# Patient Record
Sex: MALE | Race: WHITE | NOT HISPANIC OR LATINO | Employment: FULL TIME | URBAN - METROPOLITAN AREA
[De-identification: names, ages, dates, MRNs, and addresses within clinical notes are randomized per-mention and may not be internally consistent; named-entity substitution may affect disease eponyms.]

---

## 2017-03-07 ENCOUNTER — HOSPITAL ENCOUNTER (EMERGENCY)
Facility: HOSPITAL | Age: 52
Discharge: HOME | End: 2017-03-07
Attending: EMERGENCY MEDICINE
Payer: COMMERCIAL

## 2017-03-07 VITALS
DIASTOLIC BLOOD PRESSURE: 76 MMHG | OXYGEN SATURATION: 98 % | RESPIRATION RATE: 18 BRPM | HEIGHT: 64 IN | BODY MASS INDEX: 27.31 KG/M2 | HEART RATE: 67 BPM | SYSTOLIC BLOOD PRESSURE: 135 MMHG | TEMPERATURE: 98 F | WEIGHT: 160 LBS

## 2017-03-07 DIAGNOSIS — R10.9 FLANK PAIN: ICD-10-CM

## 2017-03-07 DIAGNOSIS — N20.0 KIDNEY STONE: Primary | ICD-10-CM

## 2017-03-07 LAB
ANION GAP SERPL CALC-SCNC: 8 MMOL/L
BASOPHILS # BLD AUTO: 0.02 K/UL
BASOPHILS NFR BLD: 0.2 %
BILIRUB UR QL STRIP: NEGATIVE
BUN SERPL-MCNC: 19 MG/DL
CALCIUM SERPL-MCNC: 9.3 MG/DL
CHLORIDE SERPL-SCNC: 108 MMOL/L
CLARITY UR: CLEAR
CO2 SERPL-SCNC: 26 MMOL/L
COLOR UR: YELLOW
CREAT SERPL-MCNC: 1.1 MG/DL
DIFFERENTIAL METHOD: ABNORMAL
EOSINOPHIL # BLD AUTO: 0.3 K/UL
EOSINOPHIL NFR BLD: 3.3 %
ERYTHROCYTE [DISTWIDTH] IN BLOOD BY AUTOMATED COUNT: 13 %
EST. GFR  (AFRICAN AMERICAN): >60 ML/MIN/1.73 M^2
EST. GFR  (NON AFRICAN AMERICAN): >60 ML/MIN/1.73 M^2
GLUCOSE SERPL-MCNC: 124 MG/DL
GLUCOSE UR QL STRIP: NEGATIVE
HCT VFR BLD AUTO: 41.6 %
HGB BLD-MCNC: 15 G/DL
HGB UR QL STRIP: ABNORMAL
KETONES UR QL STRIP: NEGATIVE
LEUKOCYTE ESTERASE UR QL STRIP: NEGATIVE
LYMPHOCYTES # BLD AUTO: 1.1 K/UL
LYMPHOCYTES NFR BLD: 13.5 %
MCH RBC QN AUTO: 31.5 PG
MCHC RBC AUTO-ENTMCNC: 36.1 %
MCV RBC AUTO: 87 FL
MICROSCOPIC COMMENT: ABNORMAL
MONOCYTES # BLD AUTO: 0.5 K/UL
MONOCYTES NFR BLD: 6 %
NEUTROPHILS # BLD AUTO: 6.4 K/UL
NEUTROPHILS NFR BLD: 77 %
NITRITE UR QL STRIP: NEGATIVE
PH UR STRIP: 5 [PH] (ref 5–8)
PLATELET # BLD AUTO: 152 K/UL
PMV BLD AUTO: 9.4 FL
POTASSIUM SERPL-SCNC: 4 MMOL/L
PROT UR QL STRIP: NEGATIVE
RBC # BLD AUTO: 4.76 M/UL
RBC #/AREA URNS HPF: 30 /HPF (ref 0–4)
SODIUM SERPL-SCNC: 142 MMOL/L
SP GR UR STRIP: >=1.03 (ref 1–1.03)
URN SPEC COLLECT METH UR: ABNORMAL
UROBILINOGEN UR STRIP-ACNC: NEGATIVE EU/DL
WBC # BLD AUTO: 8.37 K/UL
WBC #/AREA URNS HPF: 1 /HPF (ref 0–5)

## 2017-03-07 PROCEDURE — 63600175 PHARM REV CODE 636 W HCPCS: Performed by: EMERGENCY MEDICINE

## 2017-03-07 PROCEDURE — 80048 BASIC METABOLIC PNL TOTAL CA: CPT

## 2017-03-07 PROCEDURE — 99284 EMERGENCY DEPT VISIT MOD MDM: CPT | Mod: 25

## 2017-03-07 PROCEDURE — 85025 COMPLETE CBC W/AUTO DIFF WBC: CPT

## 2017-03-07 PROCEDURE — 96374 THER/PROPH/DIAG INJ IV PUSH: CPT

## 2017-03-07 PROCEDURE — 81000 URINALYSIS NONAUTO W/SCOPE: CPT

## 2017-03-07 RX ORDER — TAMSULOSIN HYDROCHLORIDE 0.4 MG/1
0.4 CAPSULE ORAL DAILY
Qty: 10 CAPSULE | Refills: 0 | Status: ON HOLD | OUTPATIENT
Start: 2017-03-07 | End: 2019-08-12 | Stop reason: HOSPADM

## 2017-03-07 RX ORDER — KETOROLAC TROMETHAMINE 30 MG/ML
30 INJECTION, SOLUTION INTRAMUSCULAR; INTRAVENOUS ONCE
Status: COMPLETED | OUTPATIENT
Start: 2017-03-07 | End: 2017-03-07

## 2017-03-07 RX ORDER — AMOXICILLIN 500 MG
2 CAPSULE ORAL
COMMUNITY

## 2017-03-07 RX ORDER — KETOROLAC TROMETHAMINE 10 MG/1
10 TABLET, FILM COATED ORAL EVERY 6 HOURS
Qty: 8 TABLET | Refills: 0 | Status: ON HOLD | OUTPATIENT
Start: 2017-03-07 | End: 2019-08-12

## 2017-03-07 RX ORDER — ONDANSETRON 4 MG/1
4 TABLET, FILM COATED ORAL EVERY 6 HOURS
Qty: 12 TABLET | Refills: 0 | Status: ON HOLD | OUTPATIENT
Start: 2017-03-07 | End: 2019-08-12

## 2017-03-07 RX ORDER — SODIUM, POTASSIUM,MAG SULFATES 17.5-3.13G
1 SOLUTION, RECONSTITUTED, ORAL ORAL
Status: ON HOLD | COMMUNITY
Start: 2016-06-27 | End: 2019-08-12

## 2017-03-07 RX ORDER — ASPIRIN 81 MG/1
81 TABLET ORAL
COMMUNITY

## 2017-03-07 RX ADMIN — KETOROLAC TROMETHAMINE 30 MG: 30 INJECTION, SOLUTION INTRAMUSCULAR at 08:03

## 2017-03-07 NOTE — ED AVS SNAPSHOT
[facility] -   [address]  [facility] [address]               Sanket Tenorio   3/7/2017  7:47 AM   ED    Description:  Male : 1965   Department:  [facility]           Your Care was Coordinated By:     Provider Role From To    Oswald Khan MD Attending Provider 17 0754 --      Reason for Visit     Flank Pain           Diagnoses this Visit        Comments    Kidney stone    -  Primary     Flank pain           ED Disposition     None           To Do List           Follow-up Information     Follow up with Benji Thorne MD In 1 day.    Specialty:  Internal Medicine    Why:  Patient should return to the ED for any concerns or worsening of condition.    Contact information:    [address] [address] [phone]          Follow up with Benji Lo IV, MD.    Specialty:  Urology    Why:  As needed    Contact information:    [address]  [place] [address] [phone]         These Medications        Disp Refills Start End    tamsulosin (FLOMAX) 0.4 mg Cp24 10 capsule 0 3/7/2017 3/7/2018    Take 1 capsule (0.4 mg total) by mouth once daily. - Oral    ondansetron (ZOFRAN) 4 MG tablet 12 tablet 0 3/7/2017     Take 1 tablet (4 mg total) by mouth every 6 (six) hours. - Oral    ketorolac (TORADOL) 10 mg tablet 8 tablet 0 3/7/2017     Take 1 tablet (10 mg total) by mouth every 6 (six) hours. - Oral      [facility] On [facility]     [facility] -  Assistance  Registered nurses in the [facility] provide clinical advisement, health education, appointment booking, and other advisory services.  Call for this free service at [phone].             Medications           Message regarding Medications     Verify the changes and/or additions to your medication regime listed below are the same as discussed with your clinician today.  If any of these changes or additions are incorrect,  "please notify your healthcare provider.        START taking these NEW medications        Refills    tamsulosin (FLOMAX) 0.4 mg Cp24 0    Sig: Take 1 capsule (0.4 mg total) by mouth once daily.    Class: Print    Route: Oral    ondansetron (ZOFRAN) 4 MG tablet 0    Sig: Take 1 tablet (4 mg total) by mouth every 6 (six) hours.    Class: Print    Route: Oral    ketorolac (TORADOL) 10 mg tablet 0    Sig: Take 1 tablet (10 mg total) by mouth every 6 (six) hours.    Class: Print    Route: Oral      These medications were administered today        Dose Freq    ketorolac injection 30 mg 30 mg Once    Sig: Inject 30 mg into the vein once.    Class: Normal    Route: Intravenous    Non-formulary Exception Code: Defer to pharmacy           Verify that the below list of medications is an accurate representation of the medications you are currently taking.  If none reported, the list may be blank. If incorrect, please contact your healthcare provider. Carry this list with you in case of emergency.           Current Medications     aspirin (ECOTRIN) 81 MG EC tablet Take 81 mg by mouth.    B COMPLEX C NO.10/FOLIC ACID (FOLIC ACID-B COMPLEX & C NO.10 ORAL) Take by mouth.    fish oil-omega-3 fatty acids 300-1,000 mg capsule Take 2 g by mouth.    ranitidine (ZANTAC) 150 MG tablet     sodium,potassium,mag sulfates 17.5-3.13-1.6 gram SolR Take 1 Bottle by mouth.    ketorolac (TORADOL) 10 mg tablet Take 1 tablet (10 mg total) by mouth every 6 (six) hours.    ondansetron (ZOFRAN) 4 MG tablet Take 1 tablet (4 mg total) by mouth every 6 (six) hours.    tamsulosin (FLOMAX) 0.4 mg Cp24 Take 1 capsule (0.4 mg total) by mouth once daily.           Clinical Reference Information           Your Vitals Were     BP Pulse Temp Resp Height Weight    135/76 (BP Location: Right arm, Patient Position: Lying, BP Method: Automatic) 67 97.8 °F (36.6 °C) (Oral) 18 5' 4" (1.626 m) 72.6 kg (160 lb)    SpO2 BMI             98% 27.46 kg/m2         Allergies as " of 3/7/2017        Reactions    Cephalexin     Clindamycin Hcl     Penicillins     Promethazine       Immunizations Administered on Date of Encounter - 3/7/2017     None      ED Micro, Lab, POCT     Start Ordered       Status Ordering Provider    03/07/17 0807 03/07/17 0807  CBC auto differential  STAT      Final result     03/07/17 0807 03/07/17 0807  Basic metabolic panel  STAT      Final result     03/07/17 0807 03/07/17 0807  Urinalysis  STAT      Final result     03/07/17 0807 03/07/17 0807  Urinalysis Microscopic  Once      Final result       ED Imaging Orders     Start Ordered       Status Ordering Provider    03/07/17 0807 03/07/17 0807  CT Renal Stone Study ABD Pelvis WO  1 time imaging      Final result       [facility] Sign-Up     Activating your [facility] account is as easy as 1-2-3!     1) Visit [email or web address], select Sign Up Now, enter this activation code and your date of birth, then select Next.  [email or web address]  Expires: 4/21/2017 11:43 AM      2) Create a username and password to use when you visit [facility] in the future and select a security question in case you lose your password and select Next.    3) Enter your e-mail address and click Sign Up!    Additional Information  If you have questions, please e-mail [email or web address] or call [phone] to talk to our [facility] staff. Remember, [facility] is NOT to be used for urgent needs. For medical emergencies, dial 911.          [facility] complies with applicable Federal civil rights laws and does not discriminate on the basis of race, color, national origin, age, disability, or sex.        Language Assistance Services     ATTENTION: Language assistance services are available, free of charge. Please call [phone].      ATENCIÓN: Si habla español, tiene a cannon disposición servicios gratuitos de asistencia lingüística. Llame al [phone].     CHÚ Ý: N?u b?n nói Ti?ng Vi?t, có các d?ch v? h? tr? ngôn ng? mi?n phí dành  bhumika palacio?[place]?i s? [phone].

## 2017-03-07 NOTE — ED PROVIDER NOTES
SCRIBE #1 NOTE: I, Ravi Bey, am scribing for, and in the presence of, Oswald Khan MD. I have scribed the entire note.      History      Chief Complaint   Patient presents with    Flank Pain     Patient c/o left flank pain, urinary urgency and nausea        Review of patient's allergies indicates:   Allergen Reactions    Cephalexin     Clindamycin hcl     Penicillins     Promethazine         HPI   HPI    3/7/2017, 8:41 AM   History obtained from the patient      History of Present Illness: Sanket Tenorio is a 51 y.o. male patient who presents to the Emergency Department for LLQ abd pain which onset gradually this AM. Pt describes his sxs as a sharp pain. Symptoms are constant and moderate in severity. Sx are exacerbated by nothing and relieved by nothing. Associated sxs include nausea and urinary frequency/urgency which onset last week. Pt states he thinks he has a kidney stone secondary to having these sxs in the past when he was dx with a kidney stone. Pt reports last having a kidney stone 7 years ago. Patient denies any fever, chills, flank pain, vomiting, diarrhea, constipation, difficulty urinating, dysuria, hematuria and all other sxs at this time. No further complaints or concerns at this time.     Arrival mode: Personal vehicle     PCP: Benji Thorne MD       Past Medical History:  History reviewed. No pertinent past medical history.    Past Surgical History:  Past Surgical History:   Procedure Laterality Date    EYE SURGERY           Family History:  History reviewed. No pertinent family history.    Social History:  Social History     Social History Main Topics    Smoking status: Never Smoker    Smokeless tobacco: Unknown    Alcohol use No    Drug use: Unknown    Sexual activity: Unknown       ROS   Review of Systems   Constitutional: Negative for chills and fever.   HENT: Negative for congestion and sore throat.    Respiratory: Negative for chest tightness and shortness of  breath.    Cardiovascular: Negative for chest pain.   Gastrointestinal: Positive for abdominal pain (LLQ) and nausea. Negative for vomiting.   Genitourinary: Positive for frequency and urgency. Negative for decreased urine volume, difficulty urinating, dysuria, flank pain and hematuria.   Musculoskeletal: Negative for back pain and neck pain.   Skin: Negative for rash.   Neurological: Negative for dizziness, numbness and headaches.   Psychiatric/Behavioral: Negative for agitation and confusion.   All other systems reviewed and are negative.      Physical Exam    Initial Vitals   BP Pulse Resp Temp SpO2   03/07/17 0741 03/07/17 0741 03/07/17 0741 03/07/17 0741 03/07/17 0741   179/92 62 18 97.8 °F (36.6 °C) 100 %      Physical Exam  Nursing Notes and Vital Signs Reviewed.  Constitutional: Patient is in no apparent distress. Awake and alert. Well-developed and well-nourished.  Head: Atraumatic. Normocephalic.  Eyes: PERRL. EOM intact. Conjunctivae are not pale. No scleral icterus.  ENT: Mucous membranes are moist. Oropharynx is clear and symmetric.    Neck: Supple. Full ROM. No lymphadenopathy.  Cardiovascular: Regular rate. Regular rhythm. No murmurs, rubs, or gallops. Distal pulses are 2+ and symmetric.  Pulmonary/Chest: No respiratory distress. Clear to auscultation bilaterally. No wheezing, rales, or rhonchi.  Abdominal: Soft and non-distended.  There is no tenderness.  No rebound, guarding, or rigidity. Good bowel sounds.  Genitourinary: No CVA tenderness.  Musculoskeletal: Moves all extremities. No obvious deformities. No edema. No calf tenderness. Radial pulses are equal and 2+ bilaterally. DP and PT pulses are equal and 2+ bilaterally.   Skin: Warm and dry.  Neurological: Patient is alert and oriented to person, place and time. Pupils ERRL and EOM normal. Cranial nerves II-XII are intact. Strength is full bilaterally; it is equal and 5/5 in bilateral upper and lower extremities. Light touch sense is intact.  "Speech is clear and normal. No acute focal neurological deficits noted.  Psychiatric: Normal affect. Good eye contact. Appropriate in content.    ED Course    Procedures  ED Vital Signs:  Vitals:    03/07/17 0741 03/07/17 1102   BP: (!) 179/92 135/76   Pulse: 62 67   Resp: 18 18   Temp: 97.8 °F (36.6 °C)    TempSrc: Oral    SpO2: 100% 98%   Weight: 72.6 kg (160 lb)    Height: 5' 4" (1.626 m)        Abnormal Lab Results:  Labs Reviewed   CBC W/ AUTO DIFFERENTIAL - Abnormal; Notable for the following:        Result Value    MCH 31.5 (*)     MCHC 36.1 (*)     Gran% 77.0 (*)     Lymph% 13.5 (*)     All other components within normal limits   BASIC METABOLIC PANEL - Abnormal; Notable for the following:     Glucose 124 (*)     All other components within normal limits   URINALYSIS - Abnormal; Notable for the following:     Specific Gravity, UA >=1.030 (*)     Occult Blood UA 3+ (*)     All other components within normal limits   URINALYSIS MICROSCOPIC - Abnormal; Notable for the following:     RBC, UA 30 (*)     All other components within normal limits        All Lab Results:  Results for orders placed or performed during the hospital encounter of 03/07/17   CBC auto differential   Result Value Ref Range    WBC 8.37 3.90 - 12.70 K/uL    RBC 4.76 4.60 - 6.20 M/uL    Hemoglobin 15.0 14.0 - 18.0 g/dL    Hematocrit 41.6 40.0 - 54.0 %    MCV 87 82 - 98 fL    MCH 31.5 (H) 27.0 - 31.0 pg    MCHC 36.1 (H) 32.0 - 36.0 %    RDW 13.0 11.5 - 14.5 %    Platelets 152 150 - 350 K/uL    MPV 9.4 9.2 - 12.9 fL    Gran # 6.4 1.8 - 7.7 K/uL    Lymph # 1.1 1.0 - 4.8 K/uL    Mono # 0.5 0.3 - 1.0 K/uL    Eos # 0.3 0.0 - 0.5 K/uL    Baso # 0.02 0.00 - 0.20 K/uL    Gran% 77.0 (H) 38.0 - 73.0 %    Lymph% 13.5 (L) 18.0 - 48.0 %    Mono% 6.0 4.0 - 15.0 %    Eosinophil% 3.3 0.0 - 8.0 %    Basophil% 0.2 0.0 - 1.9 %    Differential Method Automated    Basic metabolic panel   Result Value Ref Range    Sodium 142 136 - 145 mmol/L    Potassium 4.0 3.5 - " 5.1 mmol/L    Chloride 108 95 - 110 mmol/L    CO2 26 23 - 29 mmol/L    Glucose 124 (H) 70 - 110 mg/dL    BUN, Bld 19 6 - 20 mg/dL    Creatinine 1.1 0.5 - 1.4 mg/dL    Calcium 9.3 8.7 - 10.5 mg/dL    Anion Gap 8 8 - 16 mmol/L    eGFR if African American >60 >60 mL/min/1.73 m^2    eGFR if non African American >60 >60 mL/min/1.73 m^2   Urinalysis   Result Value Ref Range    Specimen UA Urine, Clean Catch     Color, UA Yellow Yellow, Straw, Fauzia    Appearance, UA Clear Clear    pH, UA 5.0 5.0 - 8.0    Specific Gravity, UA >=1.030 (A) 1.005 - 1.030    Protein, UA Negative Negative    Glucose, UA Negative Negative    Ketones, UA Negative Negative    Bilirubin (UA) Negative Negative    Occult Blood UA 3+ (A) Negative    Nitrite, UA Negative Negative    Urobilinogen, UA Negative <2.0 EU/dL    Leukocytes, UA Negative Negative   Urinalysis Microscopic   Result Value Ref Range    RBC, UA 30 (H) 0 - 4 /hpf    WBC, UA 1 0 - 5 /hpf    Microscopic Comment SEE COMMENT          Imaging Results:  Imaging Results         CT Renal Stone Study ABD Pelvis WO (Final result) Result time:  03/07/17 09:36:34    Final result by Rajinder Hoover MD (03/07/17 09:36:34)    Impression:        0.3 cm obstructing stone distal left ureter with mild left hydronephrosis.    Punctate nonobstructing stone right kidney.    Mild splenomegaly.      All CT scans at this facility use dose modulation, iterative reconstruction and/or weight based dosing when appropriate to reduced radiation dose to as low as reasonably achievable.        Electronically signed by: RAJINDER HOOVER MD  Date:     03/07/17  Time:    09:36     Narrative:    EXAM:CT RENAL STONE STUDY ABD PELVIS WO 03/07/17 08:50:06      HISTORY: Left lower quadrant abdominal pain.    TECHNIQUE: Standard axial imaging performed with reformatted sagittal and coronal images.       COMPARISON: No previous examination available for comparison.      FINDINGS:    Lung bases are clear.    The  liver, gallbladder are normal.  The spleen is mildly enlarged measuring up to 14.3 cm in maximal cross-sectional dimension.  The pancreas is normal.  Adrenal glands are normal.  The aorta and IVC are normal.    The left kidney demonstrates mild left hydronephrosis.  There is a 0.3 cm obstructing calculus within the distal left ureter located approximately 2 cm proximal to the uterovesical junction.  Mild left hydronephrosis and ureteral dilatation is present.    0.3 cm nonobstructing calculus identified upper pole right kidney.  Right ureter is normal.  The bladder is normal.    The stomach is normal in appearance.  The small intestine is normal.  The pancreas is normal.  Colon is normal.  The rectum is normal.    The bladder is only minimally distended.  The prostate is normal.    Degenerative changes of the spine noted.  No suspicious osseous lesions.                      The Emergency Provider reviewed the vital signs and test results, which are outlined above.    ED Discussion     11:05 AM: Reassessed pt at this time.  Pt states his condition has improved at this time. Pt is laying comfortably in ED bed and in NAD. Pt is awake, alert, and oriented. Discussed with pt all pertinent ED information and results. Instructed pt to f/u with Dr. oL (Urology). Discussed pt dx and plan of tx. Gave pt all f/u and return to the ED instructions. All questions and concerns were addressed at this time. Pt expresses understanding of information and instructions, and is comfortable with plan to discharge. Pt is stable for discharge.    I discussed with patient and/or family/caretaker that evaluation in the ED does not suggest any emergent or life threatening medical conditions requiring immediate intervention beyond what was provided in the ED, and I believe patient is safe for discharge.  Regardless, an unremarkable evaluation in the ED does not preclude the development or presence of a serious of life threatening  condition. As such, patient was instructed to return immediately for any worsening or change in current symptoms.      ED Medication(s):  Medications   ketorolac injection 30 mg (30 mg Intravenous Given 3/7/17 0830)       New Prescriptions    No medications on file             Medical Decision Making    Medical Decision Making:   Clinical Tests:   Lab Tests: Reviewed and Ordered  Radiological Study: Reviewed and Ordered           Scribe Attestation:   Scribe #1: I performed the above scribed service and the documentation accurately describes the services I performed. I attest to the accuracy of the note.    Attending:   Physician Attestation Statement for Scribe #1: I, Oswald Khan MD, personally performed the services described in this documentation, as scribed by Ravi Bey, in my presence, and it is both accurate and complete.          Clinical Impression       ICD-10-CM ICD-9-CM   1. Flank pain R10.9 789.09       Disposition:   Disposition: Discharged  Condition: Stable         Oswald Khan MD  03/07/17 1510

## 2017-03-07 NOTE — ED NOTES
Patient identifiers verified and correct for Sanket BRADY Tenorio.    Patient c/o left flank pain, urinary urgency and nausea for the last few days.    LOC: The patient is awake, alert and aware of environment with an appropriate affect, the patient is oriented x 3 and speaking appropriately.  APPEARANCE: Patient resting comfortably and in no acute distress, patient is clean and well groomed, patient's clothing is properly fastened.  SKIN: The skin is warm and dry, color consistent with ethnicity, patient has normal skin turgor and moist mucus membranes, skin intact, no breakdown or bruising noted.  MUSCULOSKELETAL: Patient moving all extremities spontaneously.  RESPIRATORY: Airway is open and patent, respirations are spontaneous.  CARDIAC: Patient has a normal rate, no periphreal edema noted, capillary refill < 3 seconds, elevated BP  ABDOMEN: Soft and non tender to palpation.

## 2019-08-12 ENCOUNTER — HOSPITAL ENCOUNTER (OUTPATIENT)
Facility: HOSPITAL | Age: 54
Discharge: HOME | End: 2019-08-12
Attending: EMERGENCY MEDICINE | Admitting: INTERNAL MEDICINE
Payer: COMMERCIAL

## 2019-08-12 VITALS
RESPIRATION RATE: 20 BRPM | DIASTOLIC BLOOD PRESSURE: 68 MMHG | OXYGEN SATURATION: 94 % | SYSTOLIC BLOOD PRESSURE: 108 MMHG | WEIGHT: 160.5 LBS | TEMPERATURE: 96 F | HEART RATE: 51 BPM | BODY MASS INDEX: 27.4 KG/M2 | HEIGHT: 64 IN

## 2019-08-12 DIAGNOSIS — Z82.49 FAMILY HISTORY OF EARLY CAD: ICD-10-CM

## 2019-08-12 DIAGNOSIS — R10.13 EPIGASTRIC PAIN: ICD-10-CM

## 2019-08-12 DIAGNOSIS — R07.9 CHEST PAIN: Primary | ICD-10-CM

## 2019-08-12 PROBLEM — R73.09 ELEVATED GLUCOSE: Status: ACTIVE | Noted: 2019-08-12

## 2019-08-12 PROBLEM — K21.9 GERD (GASTROESOPHAGEAL REFLUX DISEASE): Status: ACTIVE | Noted: 2019-08-12

## 2019-08-12 LAB
ALBUMIN SERPL BCP-MCNC: 3.9 G/DL (ref 3.5–5.2)
ALP SERPL-CCNC: 109 U/L (ref 55–135)
ALT SERPL W/O P-5'-P-CCNC: 49 U/L (ref 10–44)
ANION GAP SERPL CALC-SCNC: 7 MMOL/L (ref 8–16)
AST SERPL-CCNC: 23 U/L (ref 10–40)
BASOPHILS # BLD AUTO: 0.01 K/UL (ref 0–0.2)
BASOPHILS NFR BLD: 0.2 % (ref 0–1.9)
BILIRUB SERPL-MCNC: 0.3 MG/DL (ref 0.1–1)
BUN SERPL-MCNC: 19 MG/DL (ref 6–20)
CALCIUM SERPL-MCNC: 9.3 MG/DL (ref 8.7–10.5)
CHLORIDE SERPL-SCNC: 105 MMOL/L (ref 95–110)
CO2 SERPL-SCNC: 26 MMOL/L (ref 23–29)
CREAT SERPL-MCNC: 0.9 MG/DL (ref 0.5–1.4)
DIFFERENTIAL METHOD: ABNORMAL
EOSINOPHIL # BLD AUTO: 0.1 K/UL (ref 0–0.5)
EOSINOPHIL NFR BLD: 1.9 % (ref 0–8)
ERYTHROCYTE [DISTWIDTH] IN BLOOD BY AUTOMATED COUNT: 12.8 % (ref 11.5–14.5)
EST. GFR  (AFRICAN AMERICAN): >60 ML/MIN/1.73 M^2
EST. GFR  (NON AFRICAN AMERICAN): >60 ML/MIN/1.73 M^2
ESTIMATED AVG GLUCOSE: 105 MG/DL (ref 68–131)
GLUCOSE SERPL-MCNC: 140 MG/DL (ref 70–110)
HBA1C MFR BLD HPLC: 5.3 % (ref 4–5.6)
HCT VFR BLD AUTO: 42.8 % (ref 40–54)
HCV AB SERPL QL IA: NEGATIVE
HGB BLD-MCNC: 15.5 G/DL (ref 14–18)
HIV 1+2 AB+HIV1 P24 AG SERPL QL IA: NEGATIVE
LIPASE SERPL-CCNC: 8 U/L (ref 4–60)
LYMPHOCYTES # BLD AUTO: 1.2 K/UL (ref 1–4.8)
LYMPHOCYTES NFR BLD: 18.4 % (ref 18–48)
MCH RBC QN AUTO: 32.1 PG (ref 27–31)
MCHC RBC AUTO-ENTMCNC: 36.2 G/DL (ref 32–36)
MCV RBC AUTO: 89 FL (ref 82–98)
MONOCYTES # BLD AUTO: 0.3 K/UL (ref 0.3–1)
MONOCYTES NFR BLD: 5.2 % (ref 4–15)
NEUTROPHILS # BLD AUTO: 4.8 K/UL (ref 1.8–7.7)
NEUTROPHILS NFR BLD: 74.5 % (ref 38–73)
PLATELET # BLD AUTO: 167 K/UL (ref 150–350)
PMV BLD AUTO: 9.3 FL (ref 9.2–12.9)
POTASSIUM SERPL-SCNC: 4.5 MMOL/L (ref 3.5–5.1)
PROT SERPL-MCNC: 7.1 G/DL (ref 6–8.4)
RBC # BLD AUTO: 4.83 M/UL (ref 4.6–6.2)
SODIUM SERPL-SCNC: 138 MMOL/L (ref 136–145)
TROPONIN I SERPL DL<=0.01 NG/ML-MCNC: 0.01 NG/ML (ref 0–0.03)
TROPONIN I SERPL DL<=0.01 NG/ML-MCNC: 0.01 NG/ML (ref 0–0.03)
TROPONIN I SERPL DL<=0.01 NG/ML-MCNC: 0.02 NG/ML (ref 0–0.03)
WBC # BLD AUTO: 6.4 K/UL (ref 3.9–12.7)

## 2019-08-12 PROCEDURE — 25000003 PHARM REV CODE 250: Performed by: PHYSICIAN ASSISTANT

## 2019-08-12 PROCEDURE — G0378 HOSPITAL OBSERVATION PER HR: HCPCS

## 2019-08-12 PROCEDURE — 93005 ELECTROCARDIOGRAM TRACING: CPT

## 2019-08-12 PROCEDURE — 84484 ASSAY OF TROPONIN QUANT: CPT

## 2019-08-12 PROCEDURE — 63600175 PHARM REV CODE 636 W HCPCS: Performed by: EMERGENCY MEDICINE

## 2019-08-12 PROCEDURE — 96374 THER/PROPH/DIAG INJ IV PUSH: CPT

## 2019-08-12 PROCEDURE — 86803 HEPATITIS C AB TEST: CPT

## 2019-08-12 PROCEDURE — 93010 EKG 12-LEAD: ICD-10-PCS | Mod: ,,, | Performed by: INTERNAL MEDICINE

## 2019-08-12 PROCEDURE — 86703 HIV-1/HIV-2 1 RESULT ANTBDY: CPT

## 2019-08-12 PROCEDURE — 80053 COMPREHEN METABOLIC PANEL: CPT

## 2019-08-12 PROCEDURE — 84484 ASSAY OF TROPONIN QUANT: CPT | Mod: 91

## 2019-08-12 PROCEDURE — 93010 ELECTROCARDIOGRAM REPORT: CPT | Mod: ,,, | Performed by: INTERNAL MEDICINE

## 2019-08-12 PROCEDURE — 99285 EMERGENCY DEPT VISIT HI MDM: CPT | Mod: 25

## 2019-08-12 PROCEDURE — 83690 ASSAY OF LIPASE: CPT

## 2019-08-12 PROCEDURE — 85025 COMPLETE CBC W/AUTO DIFF WBC: CPT

## 2019-08-12 PROCEDURE — 83036 HEMOGLOBIN GLYCOSYLATED A1C: CPT

## 2019-08-12 PROCEDURE — 36415 COLL VENOUS BLD VENIPUNCTURE: CPT

## 2019-08-12 PROCEDURE — 25000003 PHARM REV CODE 250: Performed by: EMERGENCY MEDICINE

## 2019-08-12 RX ORDER — ONDANSETRON 8 MG/1
8 TABLET, ORALLY DISINTEGRATING ORAL EVERY 8 HOURS PRN
Status: DISCONTINUED | OUTPATIENT
Start: 2019-08-12 | End: 2019-08-12 | Stop reason: HOSPADM

## 2019-08-12 RX ORDER — HYDROCODONE BITARTRATE AND ACETAMINOPHEN 5; 325 MG/1; MG/1
1 TABLET ORAL EVERY 6 HOURS PRN
Status: DISCONTINUED | OUTPATIENT
Start: 2019-08-12 | End: 2019-08-12 | Stop reason: HOSPADM

## 2019-08-12 RX ORDER — ASPIRIN 325 MG
325 TABLET ORAL
Status: COMPLETED | OUTPATIENT
Start: 2019-08-12 | End: 2019-08-12

## 2019-08-12 RX ORDER — ASPIRIN 81 MG/1
81 TABLET ORAL DAILY
Status: DISCONTINUED | OUTPATIENT
Start: 2019-08-12 | End: 2019-08-12 | Stop reason: HOSPADM

## 2019-08-12 RX ORDER — ENOXAPARIN SODIUM 100 MG/ML
40 INJECTION SUBCUTANEOUS EVERY 24 HOURS
Status: DISCONTINUED | OUTPATIENT
Start: 2019-08-12 | End: 2019-08-12 | Stop reason: HOSPADM

## 2019-08-12 RX ORDER — ONDANSETRON 2 MG/ML
4 INJECTION INTRAMUSCULAR; INTRAVENOUS
Status: COMPLETED | OUTPATIENT
Start: 2019-08-12 | End: 2019-08-12

## 2019-08-12 RX ORDER — FAMOTIDINE 20 MG/1
20 TABLET, FILM COATED ORAL 2 TIMES DAILY
Status: DISCONTINUED | OUTPATIENT
Start: 2019-08-12 | End: 2019-08-12 | Stop reason: HOSPADM

## 2019-08-12 RX ORDER — ACETAMINOPHEN 325 MG/1
650 TABLET ORAL EVERY 6 HOURS PRN
Status: DISCONTINUED | OUTPATIENT
Start: 2019-08-12 | End: 2019-08-12 | Stop reason: HOSPADM

## 2019-08-12 RX ORDER — NITROGLYCERIN 0.4 MG/1
0.4 TABLET SUBLINGUAL
Status: COMPLETED | OUTPATIENT
Start: 2019-08-12 | End: 2019-08-12

## 2019-08-12 RX ADMIN — NITROGLYCERIN 0.4 MG: 0.4 TABLET, ORALLY DISINTEGRATING SUBLINGUAL at 05:08

## 2019-08-12 RX ADMIN — ONDANSETRON 4 MG: 2 INJECTION INTRAMUSCULAR; INTRAVENOUS at 05:08

## 2019-08-12 RX ADMIN — ASPIRIN 81 MG: 81 TABLET, COATED ORAL at 08:08

## 2019-08-12 RX ADMIN — ASPIRIN 325 MG ORAL TABLET 325 MG: 325 PILL ORAL at 05:08

## 2019-08-12 RX ADMIN — FAMOTIDINE 20 MG: 20 TABLET, FILM COATED ORAL at 08:08

## 2019-08-12 NOTE — ED NOTES
Armband checked, patient verified. Verified by spelling and stated name on armband along with .   LOC: The patient is awake, alert and aware of environment with an appropriate affect, the patient is oriented x 3 and speaking appropriately.  APPEARANCE: Patient resting comfortably and in no acute distress, patient is clean and well groomed  SKIN: The skin is warm and dry, color consistent with ethnicity, patient has normal skin turgor and moist mucus membranes, no breakdown or bruising noted.   MUSCULOSKELETAL: Patient moving all extremities to command  RESPIRATORY: Airway is open and patent, respirations are regular, even and non labored.  CARDIAC: Patient has a normal rate, no periphreal edema noted, capillary refill < 3 seconds. Pt states does have a hx of bradycardia when questioned about low heart rate.   ABDOMEN: Soft and non tender to palpation. Pt states started with epigastric pain at 1am with some nausea. Denies SOB or vomiting.  States has reflux and thought pain was gas but didn't go away.  Denies chest pain hx.   Call light in reach. HOB elevated to comfort.

## 2019-08-12 NOTE — NURSING
Pt arrived to unit from ED in no acute distress. VSS. Tele monitor #[id number] applied to pt and verified with monitor tech. Phone report given from ED nurse to primary nurse SHILA Chua. PIV saline locked to LUE. Bed low, side rails up x2, call light in reach, non-slip footwear socks in place. Family present at bedside. Pt has no complaints at this time, instructed to call for assistance.

## 2019-08-12 NOTE — ED NOTES
Pt states nausea is relieved at this time. States pain got better after Nitro given.  Pain 2/10.

## 2019-08-12 NOTE — HPI
Sanket Tenorio is a 53 year old female with GERD who presents to the ED with complaints of epigastric abdominal pain that work him from sleep around 1 AM today. The pain is described as pressure like and rated as high as 8/10 on a pain scale. He initially thought the pain was gas, but it was unrelieved by two bowel movements. There was initially associated nausea and, later, after the pain persisted, he experienced diaphoresis while driving himself to the ED. He denies radiation of the pain, shortness of breath and prior history of similar pain. He denies a family history or early heart disease. In the ED, work up was essentially negative.

## 2019-08-12 NOTE — ED NOTES
Pt found lying quietly in bed awake, alert and oriented x 3, denies any chest pain at present. Skin warm and dry, respirations even non-labored, BBS-CTA, abd soft non-tender, normal BS, pulses, motion and sensation intact all ext. IV in place 20 ga to left forearm, CM in place showing SB at rate of 57. Side rails up x 2, bed in low position, wife at bedside, updated pt on plan of care.

## 2019-08-12 NOTE — PROGRESS NOTES
Pt and spouse reviewed AVS they verbalized understanding and deny having further questions. The wife states she will arrange the pts follow up apts as they are not with [facility] and denies establishment with [facility] at this time since he is already followed by a pcp  Tele monitor removed and returned to MT room. PIV removed, tip in tact. Pt ambulated off unit to waiting car at this time with out incident

## 2019-08-12 NOTE — ASSESSMENT & PLAN NOTE
-Atypical in nature.   -Due to low risk, HEART score of 1, plan to repeat troponin with outpatient follow up if it remains negative.   -Continue ASA and telemetry monitoring.

## 2019-08-12 NOTE — PROGRESS NOTES
Clinical Pharmacy Progress Note: Telemetry Pharmacist Rounding     Pharmacist counseled patient on the telemetry pharmacist's availability to discuss new medications, side effects, and reasons for changes in medication during admission.   Asked if patient had any medication questions at this time.   Instructed patient to use call light with any questions or concerns to discuss with pharmacy during the admission.  Offered bedside medication delivery to patient.   Patient expressed understanding and had no further questions.    Thank you for allowing us to participate in this patient's care.  Jayne Leyva PharmD 08/12/2019 3:18 PM

## 2019-08-12 NOTE — DISCHARGE INSTRUCTIONS
Discussed trial of over the count PPI such as Prilosec 40 mg daily X 14 days. Can continue ranitidine as needed while on the PPI. But, Cardiology follow up as soon as possible for possible stress test is necessary. Return to the ED if symptoms return. If symptoms persist with a negative Cardiac work up, recommend follow up with a Gastroenterologist.

## 2019-08-12 NOTE — H&P
[facility]  History & Physical    Patient Name: Sanket Tenorio  MRN: [id number]  Admission Date: 8/12/2019  Attending Physician: Rohan Chamberlain, *   Primary Care Provider: Benji Thorne MD         Patient information was obtained from patient, past medical records and ER records.     Subjective:     Principal Problem:Chest pain    Chief Complaint:   Chief Complaint   Patient presents with    Chest Pain     epigastric chest pain described as feeling like gas, woke pt from sleep around 1am        HPI: Sanket Tenorio is a 53 year old female with GERD who presents to the ED with complaints of epigastric abdominal pain that work him from sleep around 1 AM today. The pain is described as pressure like and rated as high as 8/10 on a pain scale. He initially thought the pain was gas, but it was unrelieved by two bowel movements. There was initially associated nausea and, later, after the pain persisted, he experienced diaphoresis while driving himself to the ED. He denies radiation of the pain, shortness of breath and prior history of similar pain. He denies a family history or early heart disease. In the ED, work up was essentially negative.     Past Medical History:   Diagnosis Date    GERD (gastroesophageal reflux disease)        Past Surgical History:   Procedure Laterality Date    EYE SURGERY      TONSILLECTOMY         Review of patient's allergies indicates:   Allergen Reactions    Cephalexin     Clindamycin hcl     Penicillins     Promethazine        No current facility-administered medications on file prior to encounter.      Current Outpatient Medications on File Prior to Encounter   Medication Sig    aspirin (ECOTRIN) 81 MG EC tablet Take 81 mg by mouth.    B COMPLEX C NO.10/FOLIC ACID (FOLIC ACID-B COMPLEX & C NO.10 ORAL) Take by mouth.    fish oil-omega-3 fatty acids 300-1,000 mg capsule Take 2 g by mouth.    ranitidine (ZANTAC) 150 MG tablet      tamsulosin (FLOMAX) 0.4 mg Cp24 Take 1 capsule (0.4 mg total) by mouth once daily.    [DISCONTINUED] ketorolac (TORADOL) 10 mg tablet Take 1 tablet (10 mg total) by mouth every 6 (six) hours.    [DISCONTINUED] ondansetron (ZOFRAN) 4 MG tablet Take 1 tablet (4 mg total) by mouth every 6 (six) hours.    [DISCONTINUED] sodium,potassium,mag sulfates 17.5-3.13-1.6 gram SolR Take 1 Bottle by mouth.     Family History     Reviewed and not pertinent.         Tobacco Use    Smoking status: Never Smoker    Smokeless tobacco: Never Used   Substance and Sexual Activity    Alcohol use: No    Drug use: Never    Sexual activity: Not on file     Review of Systems   Constitutional: Positive for diaphoresis. Negative for appetite change, chills, fatigue and fever.   HENT: Negative for congestion, ear pain, mouth sores, sore throat and trouble swallowing.    Eyes: Negative for pain and visual disturbance.   Respiratory: Negative for cough, chest tightness and shortness of breath.    Cardiovascular: Negative for chest pain, palpitations and leg swelling.   Gastrointestinal: Positive for abdominal pain (epigastric) and nausea. Negative for constipation and diarrhea.   Endocrine: Negative for cold intolerance, heat intolerance, polydipsia and polyuria.   Genitourinary: Negative for dysuria, frequency and hematuria.   Musculoskeletal: Negative for arthralgias, back pain, myalgias and neck pain.   Skin: Negative for pallor, rash and wound.   Allergic/Immunologic: Negative for environmental allergies and immunocompromised state.   Neurological: Negative for dizziness, seizures, syncope, weakness, numbness and headaches.   Hematological: Negative for adenopathy. Does not bruise/bleed easily.   Psychiatric/Behavioral: Negative for agitation, confusion and sleep disturbance.     Objective:     Vital Signs (Most Recent):  Temp: 98.3 °F (36.8 °C) (08/12/19 0744)  Pulse: (!) 59 (08/12/19 0744)  Resp: 18 (08/12/19 0744)  BP: 128/72  (08/12/19 0744)  SpO2: 100 % (08/12/19 0744) Vital Signs (24h Range):  Temp:  [97.6 °F (36.4 °C)-98.3 °F (36.8 °C)] 98.3 °F (36.8 °C)  Pulse:  [52-62] 59  Resp:  [16-20] 18  SpO2:  [94 %-100 %] 100 %  BP: (110-184)/(65-80) 128/72     Weight: 72.8 kg (160 lb 7.9 oz)  Body mass index is 27.55 kg/m².    Physical Exam   Constitutional: He is oriented to person, place, and time. He appears well-developed and well-nourished.   HENT:   Head: Normocephalic and atraumatic.   Eyes: Conjunctivae are normal.   Neck: Neck supple. No JVD present.   Cardiovascular: Normal rate, regular rhythm and normal heart sounds.   Pulmonary/Chest: Effort normal and breath sounds normal. He has no wheezes.   Abdominal: Soft. Bowel sounds are normal. He exhibits no distension. There is no tenderness.   Musculoskeletal: Normal range of motion.   Neurological: He is alert and oriented to person, place, and time.   Skin: Skin is warm and dry. No rash noted.   Psychiatric: He has a normal mood and affect. His behavior is normal. Thought content normal.   Nursing note and vitals reviewed.          Significant Labs:   CBC:   Recent Labs   Lab 08/12/19  0545   WBC 6.40   HGB 15.5   HCT 42.8        CMP:   Recent Labs   Lab 08/12/19  0545      K 4.5      CO2 26   *   BUN 19   CREATININE 0.9   CALCIUM 9.3   PROT 7.1   ALBUMIN 3.9   BILITOT 0.3   ALKPHOS 109   AST 23   ALT 49*   ANIONGAP 7*   EGFRNONAA >60     Troponin:   Recent Labs   Lab 08/12/19  0545   TROPONINI 0.019     All pertinent labs within the past 24 hours have been reviewed.    Significant Imaging: I have reviewed all pertinent imaging results/findings within the past 24 hours.   Imaging Results          X-Ray Chest PA And Lateral (Final result)  Result time 08/12/19 06:28:53    Final result by Nestor Espinoza MD (08/12/19 06:28:53)                 Impression:      No acute process seen.      Electronically signed by: Nestor Espinoza,  MD  Date:    08/12/2019  Time:    06:28             Narrative:    EXAMINATION:  XR CHEST PA AND LATERAL    CLINICAL HISTORY:  Epigastric pain    COMPARISON:  None    FINDINGS:  Cardiac silhouette is normal.  The lungs demonstrate no evidence of active disease.  No evidence of pleural effusion or pneumothorax.  Bones appear intact.                                  Assessment/Plan:     * Chest pain  -Atypical in nature.   -Due to low risk, HEART score of 1, plan to repeat troponin with outpatient follow up if it remains negative.   -Continue ASA and telemetry monitoring.       Elevated glucose  -Unclear if fasting.   -Check hemoglobin A1C.       GERD (gastroesophageal reflux disease)  -Chronic.   -Continue H2 blocker.       VTE Risk Mitigation (From admission, onward)        Ordered     enoxaparin injection 40 mg  Daily      08/12/19 0817     IP VTE HIGH RISK PATIENT  Once      08/12/19 0817             NITO Sun  Department of Hospital Medicine   [facility]

## 2019-08-12 NOTE — ED NOTES
Report called to Toma RN, pt transported to room 233 with CM in place showing SB, pt belongings with wife.

## 2019-08-12 NOTE — SUBJECTIVE & OBJECTIVE
Past Medical History:   Diagnosis Date    GERD (gastroesophageal reflux disease)        Past Surgical History:   Procedure Laterality Date    EYE SURGERY      TONSILLECTOMY         Review of patient's allergies indicates:   Allergen Reactions    Cephalexin     Clindamycin hcl     Penicillins     Promethazine        No current facility-administered medications on file prior to encounter.      Current Outpatient Medications on File Prior to Encounter   Medication Sig    aspirin (ECOTRIN) 81 MG EC tablet Take 81 mg by mouth.    B COMPLEX C NO.10/FOLIC ACID (FOLIC ACID-B COMPLEX & C NO.10 ORAL) Take by mouth.    fish oil-omega-3 fatty acids 300-1,000 mg capsule Take 2 g by mouth.    ranitidine (ZANTAC) 150 MG tablet     tamsulosin (FLOMAX) 0.4 mg Cp24 Take 1 capsule (0.4 mg total) by mouth once daily.    [DISCONTINUED] ketorolac (TORADOL) 10 mg tablet Take 1 tablet (10 mg total) by mouth every 6 (six) hours.    [DISCONTINUED] ondansetron (ZOFRAN) 4 MG tablet Take 1 tablet (4 mg total) by mouth every 6 (six) hours.    [DISCONTINUED] sodium,potassium,mag sulfates 17.5-3.13-1.6 gram SolR Take 1 Bottle by mouth.     Family History     Reviewed and not pertinent.         Tobacco Use    Smoking status: Never Smoker    Smokeless tobacco: Never Used   Substance and Sexual Activity    Alcohol use: No    Drug use: Never    Sexual activity: Not on file     Review of Systems   Constitutional: Positive for diaphoresis. Negative for appetite change, chills, fatigue and fever.   HENT: Negative for congestion, ear pain, mouth sores, sore throat and trouble swallowing.    Eyes: Negative for pain and visual disturbance.   Respiratory: Negative for cough, chest tightness and shortness of breath.    Cardiovascular: Negative for chest pain, palpitations and leg swelling.   Gastrointestinal: Positive for abdominal pain (epigastric) and nausea. Negative for constipation and diarrhea.   Endocrine: Negative for cold  intolerance, heat intolerance, polydipsia and polyuria.   Genitourinary: Negative for dysuria, frequency and hematuria.   Musculoskeletal: Negative for arthralgias, back pain, myalgias and neck pain.   Skin: Negative for pallor, rash and wound.   Allergic/Immunologic: Negative for environmental allergies and immunocompromised state.   Neurological: Negative for dizziness, seizures, syncope, weakness, numbness and headaches.   Hematological: Negative for adenopathy. Does not bruise/bleed easily.   Psychiatric/Behavioral: Negative for agitation, confusion and sleep disturbance.     Objective:     Vital Signs (Most Recent):  Temp: 98.3 °F (36.8 °C) (08/12/19 0744)  Pulse: (!) 59 (08/12/19 0744)  Resp: 18 (08/12/19 0744)  BP: 128/72 (08/12/19 0744)  SpO2: 100 % (08/12/19 0744) Vital Signs (24h Range):  Temp:  [97.6 °F (36.4 °C)-98.3 °F (36.8 °C)] 98.3 °F (36.8 °C)  Pulse:  [52-62] 59  Resp:  [16-20] 18  SpO2:  [94 %-100 %] 100 %  BP: (110-184)/(65-80) 128/72     Weight: 72.8 kg (160 lb 7.9 oz)  Body mass index is 27.55 kg/m².    Physical Exam   Constitutional: He is oriented to person, place, and time. He appears well-developed and well-nourished.   HENT:   Head: Normocephalic and atraumatic.   Eyes: Conjunctivae are normal.   Neck: Neck supple. No JVD present.   Cardiovascular: Normal rate, regular rhythm and normal heart sounds.   Pulmonary/Chest: Effort normal and breath sounds normal. He has no wheezes.   Abdominal: Soft. Bowel sounds are normal. He exhibits no distension. There is no tenderness.   Musculoskeletal: Normal range of motion.   Neurological: He is alert and oriented to person, place, and time.   Skin: Skin is warm and dry. No rash noted.   Psychiatric: He has a normal mood and affect. His behavior is normal. Thought content normal.   Nursing note and vitals reviewed.          Significant Labs:   CBC:   Recent Labs   Lab 08/12/19  0545   WBC 6.40   HGB 15.5   HCT 42.8        CMP:   Recent Labs    Lab 08/12/19  0545      K 4.5      CO2 26   *   BUN 19   CREATININE 0.9   CALCIUM 9.3   PROT 7.1   ALBUMIN 3.9   BILITOT 0.3   ALKPHOS 109   AST 23   ALT 49*   ANIONGAP 7*   EGFRNONAA >60     Troponin:   Recent Labs   Lab 08/12/19  0545   TROPONINI 0.019     All pertinent labs within the past 24 hours have been reviewed.    Significant Imaging: I have reviewed all pertinent imaging results/findings within the past 24 hours.   Imaging Results          X-Ray Chest PA And Lateral (Final result)  Result time 08/12/19 06:28:53    Final result by Nestor Espinoza MD (08/12/19 06:28:53)                 Impression:      No acute process seen.      Electronically signed by: Nestor Espinoza MD  Date:    08/12/2019  Time:    06:28             Narrative:    EXAMINATION:  XR CHEST PA AND LATERAL    CLINICAL HISTORY:  Epigastric pain    COMPARISON:  None    FINDINGS:  Cardiac silhouette is normal.  The lungs demonstrate no evidence of active disease.  No evidence of pleural effusion or pneumothorax.  Bones appear intact.

## 2019-08-12 NOTE — ED PROVIDER NOTES
"SCRIBE #1 NOTE: I, Oswald Beard, am scribing for, and in the presence of, Johnson Quiros MD. I have scribed the HPI, ROS, and PEx.     SCRIBE #2 NOTE: I, Corinne Mack, am scribing for, and in the presence of,  Micheal Hahn MD. I have scribed the remaining portions of the note not scribed by Scribe #1.      History     Chief Complaint   Patient presents with    Chest Pain     epigastric chest pain described as feeling like gas, woke pt from sleep around 1am     Review of patient's allergies indicates:   Allergen Reactions    Cephalexin     Clindamycin hcl     Penicillins     Promethazine          History of Present Illness     HPI    8/12/2019, 5:19 AM  History obtained from the patient      History of Present Illness: Sanket Tenorio is a 53 y.o. male patient who presents to the Emergency Department for evaluation of worsening "pressure" upper abd pain which onset suddenly 4 hours ago while sleeping. Pt states that the sxs are similar to "gas" but denies any relief from passing a BM after onset of sxs. Symptoms are constant and moderate in severity. No mitigating or exacerbating factors reported. Associated sxs include lower CP and nausea. Patient denies any v/d, constipation, hematochezia, dysuria, hematuria, urinary frequency, SOB, BLE edema, dizziness, cough, palpitations, diaphoresis, and all other sxs at this time. No prior Tx reported. No further complaints or concerns at this time.         Arrival mode: Personal vehicle    PCP: Benji Thorne MD        Past Medical History:  History reviewed. No pertinent past medical history.    Past Surgical History:  Past Surgical History:   Procedure Laterality Date    EYE SURGERY      TONSILLECTOMY           Family History:  No family history on file.    Social History:  Social History     Tobacco Use    Smoking status: Never Smoker    Smokeless tobacco: Never Used   Substance and Sexual Activity    Alcohol use: No    Drug use: Never    " "Sexual activity: Not on file        Review of Systems     Review of Systems   Constitutional: Negative for diaphoresis and fever.   HENT: Negative for sore throat.    Respiratory: Negative for cough and shortness of breath.    Cardiovascular: Positive for chest pain (lower). Negative for palpitations and leg swelling.   Gastrointestinal: Positive for abdominal pain ("pressure", upper) and nausea. Negative for blood in stool, constipation, diarrhea and vomiting.   Genitourinary: Negative for dysuria, frequency and hematuria.   Musculoskeletal: Negative for back pain.   Skin: Negative for rash.   Neurological: Negative for dizziness and weakness.   Hematological: Does not bruise/bleed easily.   All other systems reviewed and are negative.       Physical Exam     Initial Vitals [08/12/19 0430]   BP Pulse Resp Temp SpO2   (!) 184/80 62 16 97.6 °F (36.4 °C) 99 %      MAP       --          Physical Exam  Nursing Notes and Vital Signs Reviewed.  Constitutional: Patient is in no acute distress. Well-developed and well-nourished.  Head: Atraumatic. Normocephalic.  Eyes: PERRL. EOM intact. Conjunctivae are not pale. No scleral icterus.  ENT: Mucous membranes are moist. Oropharynx is clear and symmetric.    Neck: Supple. Full ROM. No lymphadenopathy.  Cardiovascular: Regular rate. Regular rhythm. No murmurs, rubs, or gallops. Distal pulses are 2+ and symmetric.  Pulmonary/Chest: No respiratory distress. Clear to auscultation bilaterally. No wheezing or rales.  Abdominal:  Soft and non-distended.  There is no tenderness.  No rebound, guarding, or rigidity.  No organomegaly. No pulsatile mass. Normal bowel sounds.  Genitourinary: No CVA tenderness  Musculoskeletal: Moves all extremities. No obvious deformities. No edema. No calf tenderness.  Skin: Warm and dry.  Neurological:  Alert, awake, and appropriate.  Normal speech.  No acute focal neurological deficits are appreciated.  Psychiatric: Normal affect. Good eye contact. " "Appropriate in content.     ED Course   Procedures  ED Vital Signs:  Vitals:    08/12/19 0430 08/12/19 0531 08/12/19 0602   BP: (!) 184/80 (!) 161/74 110/65   Pulse: 62 (!) 52 (!) 54   Resp: 16 16 16   Temp: 97.6 °F (36.4 °C) 97.9 °F (36.6 °C)    TempSrc: Oral     SpO2: 99% 100% (!) 94%   Weight: 74.6 kg (164 lb 7.4 oz)     Height: 5' 4" (1.626 m)         Abnormal Lab Results:  Labs Reviewed   CBC W/ AUTO DIFFERENTIAL - Abnormal; Notable for the following components:       Result Value    Mean Corpuscular Hemoglobin 32.1 (*)     Mean Corpuscular Hemoglobin Conc 36.2 (*)     Gran% 74.5 (*)     All other components within normal limits   COMPREHENSIVE METABOLIC PANEL - Abnormal; Notable for the following components:    Glucose 140 (*)     ALT 49 (*)     Anion Gap 7 (*)     All other components within normal limits   HEPATITIS C ANTIBODY   LIPASE   TROPONIN I   HIV 1 / 2 ANTIBODY        All Lab Results:  Results for orders placed or performed during the hospital encounter of 08/12/19   Hepatitis C antibody   Result Value Ref Range    Hepatitis C Ab Negative    CBC auto differential   Result Value Ref Range    WBC 6.40 3.90 - 12.70 K/uL    RBC 4.83 4.60 - 6.20 M/uL    Hemoglobin 15.5 14.0 - 18.0 g/dL    Hematocrit 42.8 40.0 - 54.0 %    Mean Corpuscular Volume 89 82 - 98 fL    Mean Corpuscular Hemoglobin 32.1 (H) 27.0 - 31.0 pg    Mean Corpuscular Hemoglobin Conc 36.2 (H) 32.0 - 36.0 g/dL    RDW 12.8 11.5 - 14.5 %    Platelets 167 150 - 350 K/uL    MPV 9.3 9.2 - 12.9 fL    Gran # (ANC) 4.8 1.8 - 7.7 K/uL    Lymph # 1.2 1.0 - 4.8 K/uL    Mono # 0.3 0.3 - 1.0 K/uL    Eos # 0.1 0.0 - 0.5 K/uL    Baso # 0.01 0.00 - 0.20 K/uL    Gran% 74.5 (H) 38.0 - 73.0 %    Lymph% 18.4 18.0 - 48.0 %    Mono% 5.2 4.0 - 15.0 %    Eosinophil% 1.9 0.0 - 8.0 %    Basophil% 0.2 0.0 - 1.9 %    Differential Method Automated    Comprehensive metabolic panel   Result Value Ref Range    Sodium 138 136 - 145 mmol/L    Potassium 4.5 3.5 - 5.1 mmol/L    " Chloride 105 95 - 110 mmol/L    CO2 26 23 - 29 mmol/L    Glucose 140 (H) 70 - 110 mg/dL    BUN, Bld 19 6 - 20 mg/dL    Creatinine 0.9 0.5 - 1.4 mg/dL    Calcium 9.3 8.7 - 10.5 mg/dL    Total Protein 7.1 6.0 - 8.4 g/dL    Albumin 3.9 3.5 - 5.2 g/dL    Total Bilirubin 0.3 0.1 - 1.0 mg/dL    Alkaline Phosphatase 109 55 - 135 U/L    AST 23 10 - 40 U/L    ALT 49 (H) 10 - 44 U/L    Anion Gap 7 (L) 8 - 16 mmol/L    eGFR if African American >60 >60 mL/min/1.73 m^2    eGFR if non African American >60 >60 mL/min/1.73 m^2   Lipase   Result Value Ref Range    Lipase 8 4 - 60 U/L   Troponin I   Result Value Ref Range    Troponin I 0.019 0.000 - 0.026 ng/mL         Imaging Results:  Imaging Results          X-Ray Chest PA And Lateral (Final result)  Result time 08/12/19 06:28:53    Final result by Nestor Espinoza MD (08/12/19 06:28:53)                 Impression:      No acute process seen.      Electronically signed by: Nestor Espinoza MD  Date:    08/12/2019  Time:    06:28             Narrative:    EXAMINATION:  XR CHEST PA AND LATERAL    CLINICAL HISTORY:  Epigastric pain    COMPARISON:  None    FINDINGS:  Cardiac silhouette is normal.  The lungs demonstrate no evidence of active disease.  No evidence of pleural effusion or pneumothorax.  Bones appear intact.                                 The EKG was ordered, reviewed, and independently interpreted by the ED provider.  Interpretation time: 0436  Rate: 51 BPM  Rhythm: sinus bradycardia with sinus arrhythmia  Interpretation: Left axis deviation. No STEMI.           The Emergency Provider reviewed the vital signs and test results, which are outlined above.     ED Discussion     6:00 AM: Dr. Quiros transfers care of pt to Dr. Hahn pending lab and imaging results.    6:37 AM: Re-evaluated pt. I have discussed test results, shared treatment plan, and the need for admission with patient and family at bedside. Pt and family express understanding at this time and agree with all  information. All questions answered. Pt and family have no further questions or concerns at this time. Pt is ready for admit.    6:40 AM: Discussed case with Dr. Lancaster ([facility]). Dr. Lancaster agrees with current care and management of pt and accepts admission.   Admitting Service: Hospital medicine   Admitting Physician: Dr. Tirado  Admit to: Obs Tele      ED Medication(s):  Medications   nitroGLYCERIN SL tablet 0.4 mg (0.4 mg Sublingual Given 8/12/19 0538)   aspirin tablet 325 mg (325 mg Oral Given 8/12/19 0538)   ondansetron injection 4 mg (4 mg Intravenous Given 8/12/19 0538)       New Prescriptions    No medications on file                 Medical Decision Making:   Clinical Tests:   Lab Tests: Reviewed and Ordered  Radiological Study: Ordered and Reviewed  Medical Tests: Reviewed and Ordered             Scribe Attestation:   Scribe #1: I performed the above scribed service and the documentation accurately describes the services I performed. I attest to the accuracy of the note.     Attending:   Physician Attestation Statement for Scribe #1: I, Johnson Quiros MD, personally performed the services described in this documentation, as scribed by Oswald Beard, in my presence, and it is both accurate and complete.       Scribe Attestation:   Scribe #2: I performed the above scribed service and the documentation accurately describes the services I performed. I attest to the accuracy of the note.    Attending Attestation:           Physician Attestation for Scribe:    Physician Attestation Statement for Scribe #2: I, Micheal Hahn MD, reviewed documentation, as scribed by Corinne Mack in my presence, and it is both accurate and complete. I also acknowledge and confirm the content of the note done by Scribe #1.           Clinical Impression       ICD-10-CM ICD-9-CM   1. Chest pain R07.9 786.50   2. Epigastric pain R10.13 789.06   3. Family history of early CAD Z82.49 V17.3       Disposition:    Disposition: Placed in Observation  Condition: Fair         Micheal Hahn MD  08/12/19 0648

## 2019-08-12 NOTE — PLAN OF CARE
Problem: Adult Inpatient Plan of Care  Goal: Patient-Specific Goal (Individualization)  Outcome: Outcome(s) achieved Date Met: 08/12/19  Pt AAO  SB-SR on tele  Denies chest pain since admission  troponin trended down  Pt to be dc'd with Follow up

## 2019-08-13 NOTE — DISCHARGE SUMMARY
[facility] Hospital Medicine  Discharge Summary      Patient Name: Sanket Tenorio  MRN: [id number]  Admission Date: 8/12/2019  Hospital Length of Stay: 0 days  Discharge Date and Time: 8/12/2019  3:34 PM  Attending Physician: No att. providers found   Discharging Provider: NITO Sun  Primary Care Provider: Benji Thorne MD      HPI:   Sanket Tenorio is a 53 year old female with GERD who presents to the ED with complaints of epigastric abdominal pain that work him from sleep around 1 AM today. The pain is described as pressure like and rated as high as 8/10 on a pain scale. He initially thought the pain was gas, but it was unrelieved by two bowel movements. There was initially associated nausea and, later, after the pain persisted, he experienced diaphoresis while driving himself to the ED. He denies radiation of the pain, shortness of breath and prior history of similar pain. He denies a family history or early heart disease. In the ED, work up was essentially negative.     * No surgery found *      Hospital Course:   The patient was placed in Observation for complaints of epigastric abdominal pain that woke him from sleep. The patient has a long standing history of GERD. He was ruled out for ACS with negative cardiac enzymes. The patient's symptoms are likely GI in nature, but Cardiology follow up for possible outpatient stress test was recommended. The patient declined [facility] appointment and reports that he will contact Dr. Mckeon's office for an appointment as soon as possible. He agrees to take ASA daily and start a 14 days trial of PPI.     Final Active Diagnoses:    Diagnosis Date Noted POA    PRINCIPAL PROBLEM:  Chest pain [R07.9] 08/12/2019 Yes    Elevated glucose [R73.09] 08/12/2019 Yes    GERD (gastroesophageal reflux disease) [K21.9] 08/12/2019 Yes      Problems Resolved During this Admission:       Discharged Condition: stable    Disposition: Home or  Self Care    Follow Up:  Follow-up Information     Benji Thorne MD In 3 days.    Specialty:  Internal Medicine  Contact information:  [address] [facility] [address] [phone]             Stephen Hogan MD In 1 week.    Specialty:  Cardiology  Contact information:  [address]   [place] [address] [phone]                 Patient Instructions:      Diet Cardiac     Activity as tolerated       Significant Diagnostic Studies: Labs:   CMP   Recent Labs   Lab 08/12/19  0545      K 4.5      CO2 26   *   BUN 19   CREATININE 0.9   CALCIUM 9.3   PROT 7.1   ALBUMIN 3.9   BILITOT 0.3   ALKPHOS 109   AST 23   ALT 49*   ANIONGAP 7*   ESTGFRAFRICA >60   EGFRNONAA >60   , CBC   Recent Labs   Lab 08/12/19  0545   WBC 6.40   HGB 15.5   HCT 42.8      , Troponin   Recent Labs   Lab 08/12/19  1157   TROPONINI 0.007  0.007    and All labs within the past 24 hours have been reviewed    Pending Diagnostic Studies:     None         Medications:  Reconciled Home Medications:      Medication List      CONTINUE taking these medications    aspirin 81 MG EC tablet  Commonly known as:  ECOTRIN  Take 81 mg by mouth.     fish oil-omega-3 fatty acids 300-1,000 mg capsule  Take 2 g by mouth.     FOLIC ACID-B COMPLEX & C NO.10 ORAL  Take by mouth.     ranitidine 150 MG tablet  Commonly known as:  ZANTAC        STOP taking these medications    ketorolac 10 mg tablet  Commonly known as:  TORADOL     ondansetron 4 MG tablet  Commonly known as:  ZOFRAN     sodium,potassium,mag sulfates 17.5-3.13-1.6 gram Solr  Commonly known as:  SUPREP BOWEL PREP KIT     tamsulosin 0.4 mg Cap  Commonly known as:  FLOMAX            Indwelling Lines/Drains at time of discharge:   Lines/Drains/Airways          None          Time spent on the discharge of patient: Greater than 30 minutes. Patient was seen and examined on the date of discharge and determined to be suitable for discharge.         Hayde Mcknight,  PA  Department of Hospital Medicine  [facility]

## 2019-08-13 NOTE — HOSPITAL COURSE
The patient was placed in Observation for complaints of epigastric abdominal pain that woke him from sleep. The patient has a long standing history of GERD. He was ruled out for ACS with negative cardiac enzymes. The patient's symptoms are likely GI in nature, but Cardiology follow up for possible outpatient stress test was recommended. The patient declined [facility] appointment and reports that he will contact Dr. Mckeon's office for an appointment as soon as possible. He agrees to take ASA daily and start a 14 days trial of PPI.

## 2019-09-16 ENCOUNTER — HOSPITAL ENCOUNTER (EMERGENCY)
Facility: HOSPITAL | Age: 54
Discharge: HOME | End: 2019-09-16
Attending: EMERGENCY MEDICINE
Payer: COMMERCIAL

## 2019-09-16 VITALS
HEIGHT: 64 IN | WEIGHT: 161.19 LBS | RESPIRATION RATE: 20 BRPM | DIASTOLIC BLOOD PRESSURE: 72 MMHG | BODY MASS INDEX: 27.52 KG/M2 | HEART RATE: 52 BPM | OXYGEN SATURATION: 99 % | TEMPERATURE: 96 F | SYSTOLIC BLOOD PRESSURE: 151 MMHG

## 2019-09-16 DIAGNOSIS — R10.9 ABDOMINAL PAIN: ICD-10-CM

## 2019-09-16 DIAGNOSIS — R11.2 NON-INTRACTABLE VOMITING WITH NAUSEA, UNSPECIFIED VOMITING TYPE: ICD-10-CM

## 2019-09-16 DIAGNOSIS — R10.84 GENERALIZED ABDOMINAL CRAMPING: Primary | ICD-10-CM

## 2019-09-16 LAB
ALBUMIN SERPL BCP-MCNC: 4.2 G/DL (ref 3.5–5.2)
ALP SERPL-CCNC: 93 U/L (ref 55–135)
ALT SERPL W/O P-5'-P-CCNC: 24 U/L (ref 10–44)
ANION GAP SERPL CALC-SCNC: 11 MMOL/L (ref 8–16)
AST SERPL-CCNC: 20 U/L (ref 10–40)
BASOPHILS # BLD AUTO: 0.01 K/UL (ref 0–0.2)
BASOPHILS NFR BLD: 0.1 % (ref 0–1.9)
BILIRUB SERPL-MCNC: 0.5 MG/DL (ref 0.1–1)
BILIRUB UR QL STRIP: NEGATIVE
BUN SERPL-MCNC: 19 MG/DL (ref 6–20)
CALCIUM SERPL-MCNC: 9.5 MG/DL (ref 8.7–10.5)
CHLORIDE SERPL-SCNC: 107 MMOL/L (ref 95–110)
CLARITY UR: CLEAR
CO2 SERPL-SCNC: 22 MMOL/L (ref 23–29)
COLOR UR: YELLOW
CREAT SERPL-MCNC: 0.9 MG/DL (ref 0.5–1.4)
DIFFERENTIAL METHOD: ABNORMAL
EOSINOPHIL # BLD AUTO: 0.1 K/UL (ref 0–0.5)
EOSINOPHIL NFR BLD: 0.8 % (ref 0–8)
ERYTHROCYTE [DISTWIDTH] IN BLOOD BY AUTOMATED COUNT: 12.8 % (ref 11.5–14.5)
EST. GFR  (AFRICAN AMERICAN): >60 ML/MIN/1.73 M^2
EST. GFR  (NON AFRICAN AMERICAN): >60 ML/MIN/1.73 M^2
GLUCOSE SERPL-MCNC: 142 MG/DL (ref 70–110)
GLUCOSE UR QL STRIP: NEGATIVE
HCT VFR BLD AUTO: 41.9 % (ref 40–54)
HGB BLD-MCNC: 15.2 G/DL (ref 14–18)
HGB UR QL STRIP: NEGATIVE
KETONES UR QL STRIP: ABNORMAL
LEUKOCYTE ESTERASE UR QL STRIP: NEGATIVE
LIPASE SERPL-CCNC: <4 U/L (ref 4–60)
LYMPHOCYTES # BLD AUTO: 1 K/UL (ref 1–4.8)
LYMPHOCYTES NFR BLD: 10.6 % (ref 18–48)
MCH RBC QN AUTO: 31.7 PG (ref 27–31)
MCHC RBC AUTO-ENTMCNC: 36.3 G/DL (ref 32–36)
MCV RBC AUTO: 88 FL (ref 82–98)
MONOCYTES # BLD AUTO: 0.4 K/UL (ref 0.3–1)
MONOCYTES NFR BLD: 4.2 % (ref 4–15)
NEUTROPHILS # BLD AUTO: 7.6 K/UL (ref 1.8–7.7)
NEUTROPHILS NFR BLD: 84.5 % (ref 38–73)
NITRITE UR QL STRIP: NEGATIVE
PH UR STRIP: 6 [PH] (ref 5–8)
PLATELET # BLD AUTO: 176 K/UL (ref 150–350)
PMV BLD AUTO: 9.4 FL (ref 9.2–12.9)
POTASSIUM SERPL-SCNC: 4.3 MMOL/L (ref 3.5–5.1)
PROT SERPL-MCNC: 7.1 G/DL (ref 6–8.4)
PROT UR QL STRIP: NEGATIVE
RBC # BLD AUTO: 4.79 M/UL (ref 4.6–6.2)
SODIUM SERPL-SCNC: 140 MMOL/L (ref 136–145)
SP GR UR STRIP: >=1.03 (ref 1–1.03)
URN SPEC COLLECT METH UR: ABNORMAL
UROBILINOGEN UR STRIP-ACNC: NEGATIVE EU/DL
WBC # BLD AUTO: 9.02 K/UL (ref 3.9–12.7)

## 2019-09-16 PROCEDURE — 36415 COLL VENOUS BLD VENIPUNCTURE: CPT

## 2019-09-16 PROCEDURE — 25000003 PHARM REV CODE 250: Performed by: EMERGENCY MEDICINE

## 2019-09-16 PROCEDURE — 81003 URINALYSIS AUTO W/O SCOPE: CPT

## 2019-09-16 PROCEDURE — 93010 EKG 12-LEAD: ICD-10-PCS | Mod: ,,, | Performed by: INTERNAL MEDICINE

## 2019-09-16 PROCEDURE — 83690 ASSAY OF LIPASE: CPT

## 2019-09-16 PROCEDURE — 99285 EMERGENCY DEPT VISIT HI MDM: CPT | Mod: 25

## 2019-09-16 PROCEDURE — 85025 COMPLETE CBC W/AUTO DIFF WBC: CPT

## 2019-09-16 PROCEDURE — 93010 ELECTROCARDIOGRAM REPORT: CPT | Mod: ,,, | Performed by: INTERNAL MEDICINE

## 2019-09-16 PROCEDURE — 80053 COMPREHEN METABOLIC PANEL: CPT

## 2019-09-16 PROCEDURE — 93005 ELECTROCARDIOGRAM TRACING: CPT

## 2019-09-16 RX ORDER — ONDANSETRON 4 MG/1
4 TABLET, ORALLY DISINTEGRATING ORAL EVERY 6 HOURS PRN
Qty: 12 TABLET | Refills: 0 | Status: SHIPPED | OUTPATIENT
Start: 2019-09-16

## 2019-09-16 RX ORDER — DICYCLOMINE HYDROCHLORIDE 20 MG/1
20 TABLET ORAL 2 TIMES DAILY
Qty: 20 TABLET | Refills: 0 | Status: SHIPPED | OUTPATIENT
Start: 2019-09-16 | End: 2019-10-16

## 2019-09-16 RX ADMIN — ALUMINUM HYDROXIDE, MAGNESIUM HYDROXIDE, SIMETHICONE 50 ML: 400; 400; 40 SUSPENSION ORAL at 01:09

## 2019-09-16 NOTE — ED PROVIDER NOTES
SCRIBE #1 NOTE: I, Dahiana Lehman, am scribing for, and in the presence of, Anson Tillman MD. I have scribed the entire note.      History      Chief Complaint   Patient presents with    Abdominal Pain     generalized abd pain and N&V started at 8pm last night.        Review of patient's allergies indicates:   Allergen Reactions    Cephalexin     Clindamycin hcl     Penicillins     Promethazine         HPI   HPI    9/16/2019, 12:44 AM   History obtained from the patient      History of Present Illness: Sanket Tenorio is a 54 y.o. male patient with a PMHx of GERD who presents to the Emergency Department for evaluation of generalized abdominal pain which onset around 2030 last night. Symptoms are constant and moderate in severity. No mitigating or exacerbating factors reported. Associated sxs include nausea and emesis (x4). Patient denies any fever, chills, diarrhea, constipation, hematochezia, CP, SOB, and all other sxs at this time. No prior Tx. No further complaints or concerns at this time.       Arrival mode: Personal vehicle     PCP: Benji Thorne MD       Past Medical History:  Past Medical History:   Diagnosis Date    GERD (gastroesophageal reflux disease)        Past Surgical History:  Past Surgical History:   Procedure Laterality Date    EYE SURGERY      TONSILLECTOMY           Family History:  History reviewed. No pertinent family history.    Social History:  Social History     Tobacco Use    Smoking status: Never Smoker    Smokeless tobacco: Never Used   Substance and Sexual Activity    Alcohol use: No    Drug use: Never    Sexual activity: Not on file       ROS   Review of Systems   Constitutional: Negative for chills and fever.   HENT: Negative for sore throat.    Respiratory: Negative for shortness of breath.    Cardiovascular: Negative for chest pain.   Gastrointestinal: Positive for abdominal pain (generalized), nausea and vomiting (x4). Negative for blood in stool, constipation  "and diarrhea.   Genitourinary: Negative for dysuria.   Musculoskeletal: Negative for back pain.   Skin: Negative for rash.   Neurological: Negative for weakness.   Hematological: Does not bruise/bleed easily.   All other systems reviewed and are negative.    Physical Exam      Initial Vitals [09/16/19 0033]   BP Pulse Resp Temp SpO2   (!) 151/74 (!) 54 20 96.1 °F (35.6 °C) 98 %      MAP       --          Physical Exam  Nursing Notes and Vital Signs Reviewed.  Constitutional: Patient is in no acute distress. Well-developed and well-nourished.  Head: Atraumatic. Normocephalic.  Eyes: PERRL. EOM intact. Conjunctivae are not pale. No scleral icterus.  ENT: Mucous membranes are moist.    Neck: Supple. Full ROM. No lymphadenopathy.  Cardiovascular: Bradycardic rate. Regular rhythm. No murmurs, rubs, or gallops. Distal pulses are 2+ and symmetric.  Pulmonary/Chest: No respiratory distress. Clear to auscultation bilaterally. No wheezing or rales.  Abdominal: Soft and non-distended.  There is no tenderness.  No rebound, guarding, or rigidity. Good bowel sounds.  Genitourinary: No CVA tenderness  Musculoskeletal: Moves all extremities. No obvious deformities. No edema.  Skin: Warm and dry.  Neurological:  Alert, awake, and appropriate.  Normal speech.  No acute focal neurological deficits are appreciated.  Psychiatric: Normal affect. Good eye contact. Appropriate in content.    ED Course    Procedures  ED Vital Signs:  Vitals:    09/16/19 0033 09/16/19 0108   BP: (!) 151/74    Pulse: (!) 54 (!) 52   Resp: 20    Temp: 96.1 °F (35.6 °C)    TempSrc: Oral    SpO2: 98%    Weight: 73.1 kg (161 lb 2.5 oz)    Height: 5' 4" (1.626 m)        Abnormal Lab Results:  Labs Reviewed   CBC W/ AUTO DIFFERENTIAL - Abnormal; Notable for the following components:       Result Value    Mean Corpuscular Hemoglobin 31.7 (*)     Mean Corpuscular Hemoglobin Conc 36.3 (*)     Gran% 84.5 (*)     Lymph% 10.6 (*)     All other components within normal " limits   COMPREHENSIVE METABOLIC PANEL - Abnormal; Notable for the following components:    CO2 22 (*)     Glucose 142 (*)     All other components within normal limits   URINALYSIS, REFLEX TO URINE CULTURE - Abnormal; Notable for the following components:    Specific Gravity, UA >=1.030 (*)     Ketones, UA 2+ (*)     All other components within normal limits    Narrative:     Preferred Collection Type->Urine, Clean Catch   LIPASE        All Lab Results:  Results for orders placed or performed during the hospital encounter of 09/16/19   CBC auto differential   Result Value Ref Range    WBC 9.02 3.90 - 12.70 K/uL    RBC 4.79 4.60 - 6.20 M/uL    Hemoglobin 15.2 14.0 - 18.0 g/dL    Hematocrit 41.9 40.0 - 54.0 %    Mean Corpuscular Volume 88 82 - 98 fL    Mean Corpuscular Hemoglobin 31.7 (H) 27.0 - 31.0 pg    Mean Corpuscular Hemoglobin Conc 36.3 (H) 32.0 - 36.0 g/dL    RDW 12.8 11.5 - 14.5 %    Platelets 176 150 - 350 K/uL    MPV 9.4 9.2 - 12.9 fL    Gran # (ANC) 7.6 1.8 - 7.7 K/uL    Lymph # 1.0 1.0 - 4.8 K/uL    Mono # 0.4 0.3 - 1.0 K/uL    Eos # 0.1 0.0 - 0.5 K/uL    Baso # 0.01 0.00 - 0.20 K/uL    Gran% 84.5 (H) 38.0 - 73.0 %    Lymph% 10.6 (L) 18.0 - 48.0 %    Mono% 4.2 4.0 - 15.0 %    Eosinophil% 0.8 0.0 - 8.0 %    Basophil% 0.1 0.0 - 1.9 %    Differential Method Automated    Comprehensive metabolic panel   Result Value Ref Range    Sodium 140 136 - 145 mmol/L    Potassium 4.3 3.5 - 5.1 mmol/L    Chloride 107 95 - 110 mmol/L    CO2 22 (L) 23 - 29 mmol/L    Glucose 142 (H) 70 - 110 mg/dL    BUN, Bld 19 6 - 20 mg/dL    Creatinine 0.9 0.5 - 1.4 mg/dL    Calcium 9.5 8.7 - 10.5 mg/dL    Total Protein 7.1 6.0 - 8.4 g/dL    Albumin 4.2 3.5 - 5.2 g/dL    Total Bilirubin 0.5 0.1 - 1.0 mg/dL    Alkaline Phosphatase 93 55 - 135 U/L    AST 20 10 - 40 U/L    ALT 24 10 - 44 U/L    Anion Gap 11 8 - 16 mmol/L    eGFR if African American >60 >60 mL/min/1.73 m^2    eGFR if non African American >60 >60 mL/min/1.73 m^2   Urinalysis,  Reflex to Urine Culture Urine, Clean Catch   Result Value Ref Range    Specimen UA Urine, Clean Catch     Color, UA Yellow Yellow, Straw, Fauzia    Appearance, UA Clear Clear    pH, UA 6.0 5.0 - 8.0    Specific Gravity, UA >=1.030 (A) 1.005 - 1.030    Protein, UA Negative Negative    Glucose, UA Negative Negative    Ketones, UA 2+ (A) Negative    Bilirubin (UA) Negative Negative    Occult Blood UA Negative Negative    Nitrite, UA Negative Negative    Urobilinogen, UA Negative <2.0 EU/dL    Leukocytes, UA Negative Negative   Lipase   Result Value Ref Range    Lipase <4 4 - 60 U/L         Imaging Results:  Imaging Results          X-Ray Abdomen Flat And Erect (In process)                2:15 AM: Per ED physician, pt's XR Abdomen results: NAF.       The EKG was ordered, reviewed, and independently interpreted by the ED provider.  Interpretation time: 0:38  Rate: 43 BPM  Rhythm: Marked sinus bradycardia with sinus arrhythmia  Interpretation: No acute ST changes. No STEMI.      The Emergency Provider reviewed the vital signs and test results, which are outlined above.    ED Discussion     2:12 AM: All findings were reviewed with the patient/family in detail along with the diagnosis of recurrent abdominal cramping with nausea and vomiting.  I see no indication of an emergent process beyond that addressed during our encounter but have duly counseled the patient/family regarding the need for prompt follow-up as well as the indications that should prompt immediate return to the emergency room should new or worrisome developments occur.  The patient/family communicates understanding of all this information and all remaining questions and concerns were addressed at this time.          ED Medication(s):  Medications   GI cocktail (mylanta 30 mL, lidocaine 2 % viscous 10 mL, dicyclomine 10 mL) 50 mL (50 mLs Oral Given 9/16/19 0146)     Current Discharge Medication List      START taking these medications    Details   dicyclomine  (BENTYL) 20 mg tablet Take 1 tablet (20 mg total) by mouth 2 (two) times daily.  Qty: 20 tablet, Refills: 0      ondansetron (ZOFRAN-ODT) 4 MG TbDL Take 1 tablet (4 mg total) by mouth every 6 (six) hours as needed (nausea).  Qty: 12 tablet, Refills: 0             Follow-up Information     Benji Thorne MD. Schedule an appointment as soon as possible for a visit in 1 day.    Specialty:  Internal Medicine  Why:  for reassessment  Contact information:  [address] [facility] [address] [phone]                     Medical Decision Making    Medical Decision Making:   Clinical Tests:   Lab Tests: Ordered and Reviewed  Radiological Study: Ordered and Reviewed  Medical Tests: Ordered and Reviewed           Scribe Attestation:   Scribe #1: I performed the above scribed service and the documentation accurately describes the services I performed. I attest to the accuracy of the note.    Attending:   Physician Attestation Statement for Scribe #1: I, Anson Tillman MD, personally performed the services described in this documentation, as scribed by Dahiana Lehman, in my presence, and it is both accurate and complete.          Clinical Impression       ICD-10-CM ICD-9-CM   1. Generalized abdominal cramping R10.84 789.07   2. Abdominal pain R10.9 789.00   3. Non-intractable vomiting with nausea, unspecified vomiting type R11.2 787.01       Disposition:   Disposition: Discharged  Condition: Stable         Anson Tillman MD  09/16/19 0556